# Patient Record
Sex: FEMALE | Race: WHITE
[De-identification: names, ages, dates, MRNs, and addresses within clinical notes are randomized per-mention and may not be internally consistent; named-entity substitution may affect disease eponyms.]

---

## 2019-06-27 ENCOUNTER — HOSPITAL ENCOUNTER (OUTPATIENT)
Dept: HOSPITAL 92 - SCSULT | Age: 49
Discharge: HOME | End: 2019-06-27
Attending: INTERNAL MEDICINE
Payer: COMMERCIAL

## 2019-06-27 DIAGNOSIS — R10.9: Primary | ICD-10-CM

## 2019-06-27 DIAGNOSIS — K76.0: ICD-10-CM

## 2019-06-27 PROCEDURE — 76700 US EXAM ABDOM COMPLETE: CPT

## 2019-06-27 NOTE — ULT
ULTRASOUND ABDOMEN COMPLETE:



DATE:

6/27/2019



HISTORY:

Generalized abdominal pain in 48-year-old female.



FINDINGS:

Gallbladder: Normal wall thickness. No evidence of pericholecystic fluid, gallstones, or sludge.

Liver: Diffusely increased echogenicity, consistent with fatty liver.

Common duct caliber: 5 mm.

Bilateral kidneys: No hydronephrosis. There is a 3 x 2.5 cm region of low echogenicity at the midpole
 of the left kidney. It is uncertain whether this represents a cyst, dromedary hump, or solid

neoplasm.

Pancreas: Nonspecific sonographic appearance.

Abdominal aorta: No aneurysm

Inferior vena cava: Unremarkable where visualized.

Spleen: No splenomegaly



IMPRESSION:

1) Hepatic steatosis.

2) questionable right renal mass. Recommend CT of abdomen (preferably renal mass protocol multiphase 
CT abdomen with and without contrast, unless there is contraindication to iodinated contrast, in

which case beginning with a noncontrast CT should be initiated).



Reported By: Elier Vivar 

Electronically Signed:  6/27/2019 10:03 AM

## 2019-07-24 ENCOUNTER — HOSPITAL ENCOUNTER (OUTPATIENT)
Dept: HOSPITAL 92 - SCSCT | Age: 49
Discharge: HOME | End: 2019-07-24
Attending: INTERNAL MEDICINE
Payer: COMMERCIAL

## 2019-07-24 DIAGNOSIS — N28.89: Primary | ICD-10-CM

## 2019-07-24 DIAGNOSIS — R16.0: ICD-10-CM

## 2019-07-24 DIAGNOSIS — N20.0: ICD-10-CM

## 2019-07-24 PROCEDURE — 74178 CT ABD&PLV WO CNTR FLWD CNTR: CPT

## 2019-07-24 NOTE — CT
CT ABDOMEN AND PELVIS WITH AND WITHOUT IV CONTRAST:

 

HISTORY: 

Abnormal ultrasound, probable left renal mass.

 

FINDINGS: 

The lung bases are unremarkable.  No calcified gallstones are seen.  The spleen, pancreas, and adrena
l glands are normal.  

 

There is an 8 x 6.5 cm mass in the posterior segment of the right lobe of the liver with peripheral n
odular enhancement.  There is no complete filling of this mass seen on the 4-minute delayed image.  

 

A punctate calculus is seen in the right kidney.  No calculi are seen in the left kidney, either uret
er, or the urinary bladder.  No hydroureteral nephrosis is noted on either side.  Postcontrast images
 demonstrate no evidence of enhancing renal mass.  A punctate calculus is also seen in the left kidne
y.  There is normal contrast excretion into the ureters and urinary bladder. 

 

No free air, free fluid, or periarortic lymphadenopathy is seen in the abdomen or pelvis.  There is a
 7 mm lymph node in the right lower quadrant posterior to the cecum.  The small bowel loops are not a
bnormally dilated.  A normal-appearing appendix is present.  Uterus and ovaries are present.  There i
s no evidence of aneurysmal dilatation of the abdominal aorta.  Mild degenerative changes are noted i
n the spine.

 

IMPRESSION: 

1.  Large mass in the right lobe of the liver.  The possibility of hemangioma should be considered.  
A Technetium 99m labeled RBC scan is recommended.

 

2.  Punctate nonobstructing renal calculi without evidence of renal mass.

 

POS: OFF

## 2019-11-11 ENCOUNTER — HOSPITAL ENCOUNTER (EMERGENCY)
Dept: HOSPITAL 92 - SCSER | Age: 49
Discharge: HOME | End: 2019-11-11
Payer: COMMERCIAL

## 2019-11-11 DIAGNOSIS — S20.212A: ICD-10-CM
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
## 2019-11-11 DIAGNOSIS — F41.9: ICD-10-CM

## 2019-11-11 DIAGNOSIS — S16.1XXA: Primary | ICD-10-CM

## 2019-11-11 DIAGNOSIS — Z79.899: ICD-10-CM

## 2019-11-11 DIAGNOSIS — S80.12XA: ICD-10-CM

## 2019-11-11 DIAGNOSIS — S80.11XA: ICD-10-CM

## 2019-11-11 PROCEDURE — 71046 X-RAY EXAM CHEST 2 VIEWS: CPT
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
## 2020-07-23 ENCOUNTER — APPOINTMENT (RX ONLY)
Dept: URBAN - METROPOLITAN AREA CLINIC 91 | Facility: CLINIC | Age: 50
Setting detail: DERMATOLOGY
End: 2020-07-23

## 2020-07-23 DIAGNOSIS — D18.0 HEMANGIOMA: ICD-10-CM

## 2020-07-23 PROBLEM — D18.01 HEMANGIOMA OF SKIN AND SUBCUTANEOUS TISSUE: Status: ACTIVE | Noted: 2020-07-23

## 2020-07-23 PROCEDURE — 99202 OFFICE O/P NEW SF 15 MIN: CPT

## 2020-07-23 PROCEDURE — ? COUNSELING

## 2020-07-23 PROCEDURE — ? ADDITIONAL NOTES

## 2020-07-23 ASSESSMENT — LOCATION DETAILED DESCRIPTION DERM: LOCATION DETAILED: RIGHT CENTRAL MALAR CHEEK

## 2020-07-23 ASSESSMENT — LOCATION SIMPLE DESCRIPTION DERM: LOCATION SIMPLE: RIGHT CHEEK

## 2020-07-23 ASSESSMENT — LOCATION ZONE DERM: LOCATION ZONE: FACE
